# Patient Record
Sex: FEMALE | Race: WHITE | NOT HISPANIC OR LATINO | URBAN - METROPOLITAN AREA
[De-identification: names, ages, dates, MRNs, and addresses within clinical notes are randomized per-mention and may not be internally consistent; named-entity substitution may affect disease eponyms.]

---

## 2024-11-17 ENCOUNTER — OFFICE VISIT (OUTPATIENT)
Dept: URGENT CARE | Age: 79
End: 2024-11-17
Payer: MEDICARE

## 2024-11-17 VITALS
OXYGEN SATURATION: 97 % | DIASTOLIC BLOOD PRESSURE: 78 MMHG | RESPIRATION RATE: 16 BRPM | SYSTOLIC BLOOD PRESSURE: 139 MMHG | WEIGHT: 170 LBS | HEART RATE: 58 BPM | TEMPERATURE: 98.6 F

## 2024-11-17 VITALS
OXYGEN SATURATION: 97 % | DIASTOLIC BLOOD PRESSURE: 78 MMHG | WEIGHT: 170 LBS | HEART RATE: 58 BPM | RESPIRATION RATE: 16 BRPM | SYSTOLIC BLOOD PRESSURE: 139 MMHG

## 2024-11-17 DIAGNOSIS — S01.80XA OPEN WOUND OF FACE WITHOUT COMPLICATION, INITIAL ENCOUNTER: Primary | ICD-10-CM

## 2024-11-17 RX ORDER — AMOXICILLIN 875 MG/1
875 TABLET, FILM COATED ORAL 2 TIMES DAILY
Qty: 20 TABLET | Refills: 0 | Status: SHIPPED | OUTPATIENT
Start: 2024-11-17 | End: 2024-11-27

## 2024-11-17 ASSESSMENT — ENCOUNTER SYMPTOMS
NUMBNESS: 0
CHILLS: 0
WEAKNESS: 0
NECK PAIN: 1
WOUND: 1
FEVER: 0
SHORTNESS OF BREATH: 0
COLOR CHANGE: 0
WHEEZING: 0
CHEST TIGHTNESS: 0
COUGH: 0

## 2024-11-17 NOTE — PROGRESS NOTES
Subjective   Patient ID: Chantell Smith is a 79 y.o. female. They present today with a chief complaint of Illness (Injury on chin, fell and hit chin).    History of Present Illness  HPI    Past Medical History  Allergies as of 11/17/2024    (Not on File)       (Not in a hospital admission)       No past medical history on file.    No past surgical history on file.         Review of Systems  Review of Systems                               Objective    There were no vitals filed for this visit.  No LMP recorded.    Physical Exam    Procedures    Point of Care Test & Imaging Results from this visit  No results found for this visit on 11/17/24.   No results found.    Diagnostic study results (if any) were reviewed by ANETA Esparza.    Assessment/Plan   Allergies, medications, history, and pertinent labs/EKGs/Imaging reviewed by ANETA Esparza.     Medical Decision Making  ***    Orders and Diagnoses  There are no diagnoses linked to this encounter.    Medical Admin Record      Patient disposition: { Disposition:[id number]}    Electronically signed by ANETA Esparza  3:59 PM

## 2024-11-17 NOTE — PROGRESS NOTES
Subjective   Patient ID: Chantell Smith is a 79 y.o. female. They present today with a chief complaint of Facial Laceration (On chin ), Chest Pain, and Neck Pain.    History of Present Illness  Patient reports 5/10 pain to the inferior aspect of the chin.  Patient reports that she fell today at 1200.  Patient denies any LOC but has muscle aches.  Patient states that her symptoms are worsening despite use of Tylenol.      History provided by:  Patient   used: No    Chest Pain  Associated symptoms: no cough, no fever, no numbness, no shortness of breath and no weakness    Neck Pain  Associated symptoms: no chest pain, no fever, no numbness and no weakness        Past Medical History  Allergies as of 11/17/2024 - Reviewed 11/17/2024   Allergen Reaction Noted    Oxycodone Hives 08/12/2019       (Not in a hospital admission)       History reviewed. No pertinent past medical history.    History reviewed. No pertinent surgical history.         Review of Systems  Review of Systems   Constitutional:  Negative for chills and fever.   Respiratory:  Negative for cough, chest tightness, shortness of breath and wheezing.    Cardiovascular:  Negative for chest pain.   Musculoskeletal:  Positive for neck pain.   Skin:  Positive for wound. Negative for color change.   Neurological:  Negative for weakness and numbness.                                  Objective    Vitals:    11/17/24 1648   BP: 139/78   Pulse: 58   Resp: 16   Temp: 37 °C (98.6 °F)   SpO2: 97%   Weight: 77.1 kg (170 lb)     No LMP recorded (lmp unknown). Patient is postmenopausal.    Physical Exam  Constitutional:       General: She is not in acute distress.     Appearance: She is not ill-appearing.   Cardiovascular:      Rate and Rhythm: Normal rate and regular rhythm.      Heart sounds: No murmur heard.     No friction rub.   Pulmonary:      Effort: Pulmonary effort is normal. No respiratory distress.      Breath sounds: No wheezing, rhonchi or  rales.   Musculoskeletal:         General: Tenderness and signs of injury present. No swelling.   Skin:     Findings: No bruising or erythema.   Neurological:      Mental Status: She is alert.       Laceration Repair    Date/Time: 11/17/2024 5:15 PM    Performed by: Raghu Brito DO  Authorized by: Raghu Brito DO    Consent:     Consent obtained:  Verbal    Consent given by:  Patient    Risks discussed:  Infection, pain and poor cosmetic result  Anesthesia:     Anesthesia method:  Local infiltration    Local anesthetic:  Lidocaine 1% w/o epi  Laceration details:     Location:  Face    Face location:  Chin    Length (cm):  3    Depth (mm):  8  Pre-procedure details:     Preparation:  Patient was prepped and draped in usual sterile fashion  Exploration:     Limited defect created (wound extended): no      Imaging outcome: foreign body not noted      Wound exploration: entire depth of wound visualized    Treatment:     Area cleansed with:  Povidone-iodine    Amount of cleaning:  Standard    Debridement:  None    Undermining:  None  Skin repair:     Repair method:  Sutures    Suture size:  6-0    Suture material:  Nylon    Suture technique:  Simple interrupted    Number of sutures:  5  Approximation:     Approximation:  Close  Repair type:     Repair type:  Simple  Post-procedure details:     Dressing:  Antibiotic ointment and bulky dressing    Procedure completion:  Tolerated well, no immediate complications      Point of Care Test & Imaging Results from this visit  No results found for this visit on 11/17/24.   No results found.    Diagnostic study results (if any) were reviewed by Raghu Brito DO.    Assessment/Plan   Allergies, medications, history, and pertinent labs/EKGs/Imaging reviewed by Raghu Brito DO.     Orders and Diagnoses  There are no diagnoses linked to this encounter.    Medical Admin Record      Patient disposition: Home    Electronically signed by Raghu Brito DO  5:10 PM

## 2024-11-24 ENCOUNTER — OFFICE VISIT (OUTPATIENT)
Dept: URGENT CARE | Age: 79
End: 2024-11-24
Payer: MEDICARE

## 2024-11-24 VITALS
RESPIRATION RATE: 18 BRPM | OXYGEN SATURATION: 96 % | HEIGHT: 65 IN | BODY MASS INDEX: 28.32 KG/M2 | DIASTOLIC BLOOD PRESSURE: 74 MMHG | SYSTOLIC BLOOD PRESSURE: 113 MMHG | HEART RATE: 67 BPM | WEIGHT: 170 LBS

## 2024-11-24 DIAGNOSIS — S01.81XD LACERATION OF CHIN, SUBSEQUENT ENCOUNTER: Primary | ICD-10-CM

## 2024-11-24 NOTE — PROGRESS NOTES
"Subjective   Patient ID: Chantell Smith is a 79 y.o. female. They present today with a chief complaint of Suture / Staple Removal (6 stitches under chin. ).    History of Present Illness    Suture / Staple Removal       Removal of sutures from chin.  Sutures placed 1 week ago.  No signs of infection.  Patient denies headaches or increasing pain.  Past Medical History  Allergies as of 11/24/2024 - Reviewed 11/24/2024   Allergen Reaction Noted    Oxycodone Hives 08/12/2019       (Not in a hospital admission)       No past medical history on file.    No past surgical history on file.         Review of Systems  Review of Systems                               Objective    Vitals:    11/24/24 1107   BP: 113/74   BP Location: Right arm   Patient Position: Sitting   Pulse: 67   Resp: 18   SpO2: 96%   Weight: 77.1 kg (170 lb)   Height: 1.651 m (5' 5\")     No LMP recorded (lmp unknown). Patient is postmenopausal.    Physical Exam  Well-healed scabbed laceration under chin with 6 intact sutures.  Removed without difficulty  Suture Removal    Date/Time: 11/24/2024 11:41 AM    Performed by: Cali Watson MD  Authorized by: Cali Watson MD    Consent:     Consent obtained:  Verbal    Consent given by:  Patient    Risks, benefits, and alternatives were discussed: yes      Risks discussed:  Bleeding  [place] protocol:     Patient identity confirmed:  Verbally with patient  Location:     Location:  Head/neck    Head/neck location:  Chin  Procedure details:     Number of sutures removed:  6  Post-procedure details:     Procedure completion:  Tolerated  Comments:      Signs of infection discussed  Follow-up for infection      Point of Care Test & Imaging Results from this visit  No results found for this visit on 11/24/24.   No results found.    Diagnostic study results (if any) were reviewed by Cali Watson MD.    Assessment/Plan   Allergies, medications, history, and pertinent labs/EKGs/Imaging reviewed by Cali Watson MD. "     Medical Decision Making  At time of discharge patient was clinically well-appearing and HDS for outpatient management. The patient and/or family was educated regarding diagnosis, supportive care, OTC and Rx medications. The patient and/or family was given the opportunity to ask questions prior to discharge.  They verbalized understanding of my discussion of the plans for treatment, expected course, indications to return to  or seek further evaluation in ED, and the need for timely follow up as directed.   They were provided with a work/school excuse if requested.    Orders and Diagnoses  There are no diagnoses linked to this encounter.    Medical Admin Record      Patient disposition: Home    Electronically signed by Cali Watson MD  11:42 AM

## 2025-05-28 ENCOUNTER — APPOINTMENT (OUTPATIENT)
Dept: ORTHOPEDIC SURGERY | Facility: CLINIC | Age: 80
End: 2025-05-28
Payer: MEDICARE

## 2025-06-25 ENCOUNTER — APPOINTMENT (OUTPATIENT)
Dept: ORTHOPEDIC SURGERY | Facility: CLINIC | Age: 80
End: 2025-06-25
Payer: MEDICARE

## 2025-07-02 ENCOUNTER — APPOINTMENT (OUTPATIENT)
Dept: ORTHOPEDIC SURGERY | Facility: CLINIC | Age: 80
End: 2025-07-02
Payer: MEDICARE

## 2025-07-02 ENCOUNTER — HOSPITAL ENCOUNTER (OUTPATIENT)
Dept: RADIOLOGY | Facility: CLINIC | Age: 80
Discharge: HOME | End: 2025-07-02
Payer: MEDICARE

## 2025-07-02 VITALS — HEIGHT: 64 IN | BODY MASS INDEX: 29.02 KG/M2 | WEIGHT: 170 LBS

## 2025-07-02 DIAGNOSIS — M19.072 ARTHRITIS OF BOTH ANKLES: Primary | ICD-10-CM

## 2025-07-02 DIAGNOSIS — M25.571 RIGHT ANKLE PAIN, UNSPECIFIED CHRONICITY: ICD-10-CM

## 2025-07-02 DIAGNOSIS — M19.071 ARTHRITIS OF BOTH ANKLES: Primary | ICD-10-CM

## 2025-07-02 DIAGNOSIS — M21.40 ACQUIRED FLAT FOOT, UNSPECIFIED LATERALITY: ICD-10-CM

## 2025-07-02 DIAGNOSIS — M19.079 ARTHRITIS OF SUBTALAR JOINT: ICD-10-CM

## 2025-07-02 PROCEDURE — 99204 OFFICE O/P NEW MOD 45 MIN: CPT | Performed by: ORTHOPAEDIC SURGERY

## 2025-07-02 PROCEDURE — 1159F MED LIST DOCD IN RCRD: CPT | Performed by: ORTHOPAEDIC SURGERY

## 2025-07-02 PROCEDURE — 1036F TOBACCO NON-USER: CPT | Performed by: ORTHOPAEDIC SURGERY

## 2025-07-02 PROCEDURE — 73610 X-RAY EXAM OF ANKLE: CPT | Mod: RIGHT SIDE | Performed by: RADIOLOGY

## 2025-07-02 PROCEDURE — 73610 X-RAY EXAM OF ANKLE: CPT | Mod: RT

## 2025-07-02 RX ORDER — ATORVASTATIN CALCIUM 20 MG/1
20 TABLET, FILM COATED ORAL DAILY
COMMUNITY

## 2025-07-02 RX ORDER — DICLOFENAC SODIUM 10 MG/G
2 GEL TOPICAL 2 TIMES DAILY
COMMUNITY

## 2025-07-02 RX ORDER — MELOXICAM 7.5 MG/1
7.5 TABLET ORAL DAILY
Qty: 14 TABLET | Refills: 0 | Status: SHIPPED | OUTPATIENT
Start: 2025-07-02 | End: 2025-07-16

## 2025-07-02 RX ORDER — VALACYCLOVIR HYDROCHLORIDE 1 G/1
1000 TABLET, FILM COATED ORAL
COMMUNITY
Start: 2025-01-21

## 2025-07-02 RX ORDER — GABAPENTIN 300 MG/1
CAPSULE ORAL
COMMUNITY
Start: 2025-04-23 | End: 2026-04-23

## 2025-07-02 RX ORDER — METOPROLOL SUCCINATE 25 MG/1
25 TABLET, EXTENDED RELEASE ORAL
COMMUNITY
Start: 2025-03-04

## 2025-07-02 RX ORDER — WARFARIN SODIUM 5 MG/1
TABLET ORAL
COMMUNITY

## 2025-07-02 RX ORDER — LEVOTHYROXINE SODIUM 25 UG/1
25 TABLET ORAL
COMMUNITY
Start: 2025-01-20

## 2025-07-02 ASSESSMENT — PAIN DESCRIPTION - DESCRIPTORS: DESCRIPTORS: TINGLING;NUMBNESS

## 2025-07-02 ASSESSMENT — PAIN - FUNCTIONAL ASSESSMENT: PAIN_FUNCTIONAL_ASSESSMENT: 0-10

## 2025-07-02 NOTE — PROGRESS NOTES
79-year-old female here for right ankle and foot pain.  Gets most of her health care at [facility].  Is seen podiatry at the clinic who obtained x-rays and MRI.  Had longstanding issues with both foot and ankle although her right one is worse.  Also has some lumbar spine problems.  Has tingling in both feet by the end of the day.  She is never had physical therapy.  No injections.  She is trying to avoid surgery.  She can no longer walk long distances.  Does chair yoga but tries to limit standing exercise because of pain.  Is obtaining an [place] brace for her right hindfoot though she does have one for her left side.  She does not like to wear it all the time.  She has used neoprene lace up ankle braces for support bilaterally.  Is on Coumadin for cardiac issues.  Got some short-term relief with prednisone in the past after a fall.    On exam:  WD/WN thin female  A+O X3  NAD  No lymphedema  Inspection of both feet and ankles show symmetric arches.   Able to DTR bilaterally.   5/5 strength in all 4 planes except minimal PTT on right.  Point tender over sinus Tarsi and medial talonavicular joint bilaterally.  Mild tenderness over the anterior ankle capsule on the right.  Sensation intact to LT.   Good pulses.   Stable anterior drawer.  No peroneal subluxation.    (-) [place].     I personally reviewed the following radiographic exams: X-rays of right ankle in the office today show some medial joint space narrowing.  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  No acute changes.  MRI from [facility] shows severe subtalar and talonavicular arthritis.  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  Distal posterior tibial tendon tendinosis.    Assessment: Right hindfoot arthrosis with chronic PTTD.    Plan: Discussed nonoperative and operative options in detail.   Risk and benefits discussed in detail. All questions answered today.  Recovery timeline and expectations discussed in detail..  No  simple answer.  We discussed the surgical options of triple arthrodesis with a staged total ankle.  We discussed possible hindfoot fusion with a retrograde nail.  I think it is reasonable to try the [place] brace or current braces and except some of the pain and dysfunction.  We discussed low-dose meloxicam intermittently as an option for her.  She needs to check with her GI doctor and cardiologist to get their thoughts on this.  We discussed postop recovery from her fusion and that we can discuss that further in the future.